# Patient Record
(demographics unavailable — no encounter records)

---

## 2024-11-11 NOTE — PHYSICAL EXAM
[TextEntry] : General: AAO, no significant distress Psychiatric: affect pleasant and within normal limits Eyes: relatively symmetric, no obvious nystagmus Skin: no significant lesions on face Nose: no significant lesions; patent. Oral Cavity & Oropharynx: no significant deformity or lesions Neck/Lymphatics: no significant masses or abnormal cervical nodes palpated Respiratory: breathing comfortably; no significant distress Neurologic: cranial nerves II-XII grossly intact; EOMI Facial function: symmetric   Ear examination performed under binocular otologic microscope: Left Ear External: Pinna and periauricular area is normal. Canal: Ear canal skin is not inflamed or edematous. Left cerumen impaction cleaned under microscopy with instrumentation. Erythema and swelling. Tympanic Membrane: Intact and in good position. Tender to palpation over left TMJ, clicking   Right Ear External: Pinna and periauricular area is normal. Canal: Ear canal skin is not inflamed or edematous. Tympanic Membrane: Intact and in good position.  Procedure: Left cerumen removal Pre-operative Diagnosis: Left cerumen impaction Post-operative Diagnosis: Left cerumen impaction Anesthesia: None Procedure Details: The patient was placed in the supine position. The left ear canal was determined to be impacted with cerumen. A curette and/or suction was used to remove the cerumen impaction under microscopy. Condition: Stable. Patient tolerated procedure well. Complications: None.

## 2024-11-11 NOTE — ASSESSMENT
[FreeTextEntry1] : TMJ - discussed the treatment options for TMJ - warm compresses - massage - soft diet for 3 weeks - ibuprofen 600mg three times a day for three weeks - The potential side effects of ibuprofen were discussed at length with the patient which include but are not limited to: stomach pain, constipation, diarrhea, gas, heartburn, nausea, vomiting, dizziness, headache, rash, bleeding - Patient instructed to take ibuprofen with a meal to help prevent stomach ulcers/bleeding   Left Otitis Externa - ciprodex - 4 drops to left ear twice a day for 7 days - left cerumen and otorrhea debrided under microscopy with instrumentation - The potential side effects of ofloxacin or ciprodex were discussed at length with the patient which include but are not limited to: skin rash, ear itchiness, altered taste, digestive issues (nausea, vomiting, diarrhea, bloating, indigestion, abdominal pain, loss of appetite), allergic reaction (hives, wheezing, tightness in throat, anaphylaxis), photosensitivity (skin discoloration, inflammation, itching), dizziness rash, yeast infection, headaches - f/u in 1 week  Left Cerumen Impaction - Left cerumen impaction removed under microscopy with instrumentation - hearing improved after cleaning

## 2024-11-11 NOTE — HISTORY OF PRESENT ILLNESS
[de-identified] : 52F with L otalgia for the past week. She thought it was an ear infection and used garlic oil in the left ear. The pain is less now but still present deep inside her ear. Hearing on left decreased as well. No otalgia, otorrhea, vertigo, tinnitus. No hx of ear surgery nor ear infections.